# Patient Record
Sex: MALE | Race: WHITE
[De-identification: names, ages, dates, MRNs, and addresses within clinical notes are randomized per-mention and may not be internally consistent; named-entity substitution may affect disease eponyms.]

---

## 2020-09-10 PROBLEM — Z00.00 ENCOUNTER FOR PREVENTIVE HEALTH EXAMINATION: Status: ACTIVE | Noted: 2020-09-10

## 2020-09-11 ENCOUNTER — APPOINTMENT (OUTPATIENT)
Dept: COLORECTAL SURGERY | Facility: CLINIC | Age: 55
End: 2020-09-11
Payer: COMMERCIAL

## 2020-09-11 VITALS
RESPIRATION RATE: 14 BRPM | WEIGHT: 230 LBS | TEMPERATURE: 94.8 F | HEIGHT: 69 IN | HEART RATE: 64 BPM | SYSTOLIC BLOOD PRESSURE: 116 MMHG | DIASTOLIC BLOOD PRESSURE: 76 MMHG | BODY MASS INDEX: 34.07 KG/M2

## 2020-09-11 DIAGNOSIS — Z78.9 OTHER SPECIFIED HEALTH STATUS: ICD-10-CM

## 2020-09-11 DIAGNOSIS — Z87.19 PERSONAL HISTORY OF OTHER DISEASES OF THE DIGESTIVE SYSTEM: ICD-10-CM

## 2020-09-11 DIAGNOSIS — K59.00 CONSTIPATION, UNSPECIFIED: ICD-10-CM

## 2020-09-11 DIAGNOSIS — K92.2 GASTROINTESTINAL HEMORRHAGE, UNSPECIFIED: ICD-10-CM

## 2020-09-11 DIAGNOSIS — Z86.79 PERSONAL HISTORY OF OTHER DISEASES OF THE CIRCULATORY SYSTEM: ICD-10-CM

## 2020-09-11 DIAGNOSIS — Z12.11 ENCOUNTER FOR SCREENING FOR MALIGNANT NEOPLASM OF COLON: ICD-10-CM

## 2020-09-11 PROCEDURE — 99203 OFFICE O/P NEW LOW 30 MIN: CPT

## 2020-09-11 RX ORDER — VALSARTAN AND HYDROCHLOROTHIAZIDE 160; 12.5 MG/1; MG/1
160-12.5 TABLET, FILM COATED ORAL
Refills: 0 | Status: ACTIVE | COMMUNITY

## 2020-09-11 NOTE — REVIEW OF SYSTEMS
[As Noted in HPI] : as noted in HPI [Constipation] : constipation [Negative] : Heme/Lymph [FreeTextEntry7] : Bleeding

## 2020-09-11 NOTE — PHYSICAL EXAM
[Abdomen Masses] : No abdominal masses [Tender] : nontender [Abdomen Tenderness] : ~T No ~M abdominal tenderness [JVD] : no jugular venous distention  [Normal Breath Sounds] : Normal breath sounds [Normal Rate and Rhythm] : normal rate and rhythm [Normal Heart Sounds] : normal heart sounds [No Rash or Lesion] : No rash or lesion [Alert] : alert [Oriented to Person] : oriented to person [Oriented to Place] : oriented to place [Calm] : calm [Oriented to Time] : oriented to time [de-identified] : obese [de-identified] : Looks well in no distress, of stated age. [de-identified] : pupils equal reactive to light normocephalic atraumatic. [de-identified] : moves all 4 extremities appropriately with 5 over 5 strength

## 2020-09-11 NOTE — ASSESSMENT
[FreeTextEntry1] : 55-year-old male with changes in bowel habits constipation rectal bleeding. Recommend colonoscopy. Risks and benefits of colonoscopy have been discussed which include but not limited to bleeding, perforation, missing a cancer or polyp occurring 5%.\par 
no

## 2020-09-11 NOTE — HISTORY OF PRESENT ILLNESS
[FreeTextEntry1] : Consultation requested by Dr. Sascha garcia for recent changes in bowel habits and bleeding. Patient also has a history of constipation symptoms have been worsening over the last several months has never had a colonoscopy

## 2020-09-11 NOTE — CONSULT LETTER
[Dear  ___] : Dear  [unfilled], [Consult Letter:] : I had the pleasure of evaluating your patient, [unfilled]. [Please see my note below.] : Please see my note below. [Consult Closing:] : Thank you very much for allowing me to participate in the care of this patient.  If you have any questions, please do not hesitate to contact me. [Sincerely,] : Sincerely, [FreeTextEntry3] : Terell Gross M.D. FACS, FASCRS

## 2020-09-17 DIAGNOSIS — Z01.818 ENCOUNTER FOR OTHER PREPROCEDURAL EXAMINATION: ICD-10-CM

## 2020-09-19 ENCOUNTER — APPOINTMENT (OUTPATIENT)
Dept: DISASTER EMERGENCY | Facility: CLINIC | Age: 55
End: 2020-09-19

## 2020-09-20 LAB — SARS-COV-2 N GENE NPH QL NAA+PROBE: NOT DETECTED

## 2020-09-22 ENCOUNTER — APPOINTMENT (OUTPATIENT)
Dept: COLORECTAL SURGERY | Facility: CLINIC | Age: 55
End: 2020-09-22
Payer: COMMERCIAL

## 2020-09-22 PROCEDURE — 45380 COLONOSCOPY AND BIOPSY: CPT

## 2020-12-23 PROBLEM — Z12.11 ENCOUNTER FOR SCREENING COLONOSCOPY: Status: RESOLVED | Noted: 2020-09-11 | Resolved: 2020-12-23

## 2023-10-17 ENCOUNTER — APPOINTMENT (OUTPATIENT)
Dept: COLORECTAL SURGERY | Facility: CLINIC | Age: 58
End: 2023-10-17
Payer: COMMERCIAL

## 2023-10-17 DIAGNOSIS — Z86.010 PERSONAL HISTORY OF COLONIC POLYPS: ICD-10-CM

## 2023-10-17 DIAGNOSIS — R19.4 CHANGE IN BOWEL HABIT: ICD-10-CM

## 2023-10-17 DIAGNOSIS — K62.5 HEMORRHAGE OF ANUS AND RECTUM: ICD-10-CM

## 2023-10-17 PROCEDURE — 99204 OFFICE O/P NEW MOD 45 MIN: CPT

## 2023-11-02 ENCOUNTER — NON-APPOINTMENT (OUTPATIENT)
Age: 58
End: 2023-11-02

## 2024-02-07 ENCOUNTER — RESULT REVIEW (OUTPATIENT)
Age: 59
End: 2024-02-07

## 2024-02-07 ENCOUNTER — APPOINTMENT (OUTPATIENT)
Dept: COLORECTAL SURGERY | Facility: AMBULATORY MEDICAL SERVICES | Age: 59
End: 2024-02-07
Payer: COMMERCIAL

## 2024-02-07 PROCEDURE — 45380 COLONOSCOPY AND BIOPSY: CPT | Mod: 59

## 2024-02-07 PROCEDURE — 45385 COLONOSCOPY W/LESION REMOVAL: CPT

## 2024-02-09 ENCOUNTER — NON-APPOINTMENT (OUTPATIENT)
Age: 59
End: 2024-02-09

## 2025-05-22 ENCOUNTER — OFFICE (OUTPATIENT)
Dept: URBAN - METROPOLITAN AREA CLINIC 1 | Facility: CLINIC | Age: 60
Setting detail: OPHTHALMOLOGY
End: 2025-05-22
Payer: COMMERCIAL

## 2025-05-22 DIAGNOSIS — H43.813: ICD-10-CM

## 2025-05-22 DIAGNOSIS — H25.13: ICD-10-CM

## 2025-05-22 DIAGNOSIS — H52.4: ICD-10-CM

## 2025-05-22 PROCEDURE — 92015 DETERMINE REFRACTIVE STATE: CPT | Performed by: OPHTHALMOLOGY

## 2025-05-22 PROCEDURE — 92004 COMPRE OPH EXAM NEW PT 1/>: CPT | Performed by: OPHTHALMOLOGY

## 2025-05-22 PROCEDURE — 92202 OPSCPY EXTND ON/MAC DRAW: CPT | Performed by: OPHTHALMOLOGY

## 2025-05-22 ASSESSMENT — REFRACTION_AUTOREFRACTION
OD_CYLINDER: -1.75
OS_AXIS: 160
OD_AXIS: 020
OS_SPHERE: -2.00
OS_CYLINDER: -1.25
OD_SPHERE: -1.50

## 2025-05-22 ASSESSMENT — KERATOMETRY
OD_AXISANGLE_DEGREES: 104
OD_K1POWER_DIOPTERS: 42.25
OD_K2POWER_DIOPTERS: 43.75
OS_K1POWER_DIOPTERS: 42.00
OS_AXISANGLE_DEGREES: 074
OS_K2POWER_DIOPTERS: 43.25

## 2025-05-22 ASSESSMENT — REFRACTION_MANIFEST
OD_SPHERE: -1.50
OS_ADD: +2.50
OS_VA1: 20/30-1
OS_VA1: 20/30-1
OD_AXIS: 020
OD_VA1: 20/30-2
OS_CYLINDER: -1.25
OD_CYLINDER: -1.75
OS_SPHERE: -2.00
OS_AXIS: 160
OD_ADD: +2.50
OD_AXIS: 020
OS_AXIS: 160
OD_VA1: 20/30-2
OD_SPHERE: -1.50
OD_CYLINDER: -1.75
OS_CYLINDER: -1.25
OS_SPHERE: -2.00

## 2025-05-22 ASSESSMENT — REFRACTION_CURRENTRX
OS_CYLINDER: -1.25
OD_AXIS: 016
OS_OVR_VA: 20/
OD_VPRISM_DIRECTION: PROGS
OS_SPHERE: -2.50
OD_OVR_VA: 20/
OS_AXIS: 152
OD_SPHERE: --1.50
OD_CYLINDER: -1.75
OD_ADD: +2.75
OS_VPRISM_DIRECTION: PROGS
OS_ADD: +2.75

## 2025-05-22 ASSESSMENT — VISUAL ACUITY
OS_BCVA: 20/25-1
OD_BCVA: 20/25-1

## 2025-05-22 ASSESSMENT — TONOMETRY
OS_IOP_MMHG: 20
OD_IOP_MMHG: 21

## 2025-05-22 ASSESSMENT — CONFRONTATIONAL VISUAL FIELD TEST (CVF)
OD_FINDINGS: FULL
OS_FINDINGS: FULL